# Patient Record
Sex: FEMALE | Race: WHITE | NOT HISPANIC OR LATINO | URBAN - NONMETROPOLITAN AREA
[De-identification: names, ages, dates, MRNs, and addresses within clinical notes are randomized per-mention and may not be internally consistent; named-entity substitution may affect disease eponyms.]

---

## 2017-01-04 ENCOUNTER — OFFICE (OUTPATIENT)
Dept: URBAN - NONMETROPOLITAN AREA CLINIC 13 | Facility: CLINIC | Age: 52
End: 2017-01-04

## 2017-01-04 VITALS — HEIGHT: 64 IN

## 2017-01-04 VITALS
HEIGHT: 64 IN | SYSTOLIC BLOOD PRESSURE: 145 MMHG | DIASTOLIC BLOOD PRESSURE: 68 MMHG | WEIGHT: 212 LBS | HEART RATE: 63 BPM | RESPIRATION RATE: 18 BRPM

## 2017-01-04 DIAGNOSIS — R10.12 LEFT UPPER QUADRANT PAIN: ICD-10-CM

## 2017-01-04 DIAGNOSIS — Z79.899 OTHER LONG TERM (CURRENT) DRUG THERAPY: ICD-10-CM

## 2017-01-04 DIAGNOSIS — R74.0 NONSPECIFIC ELEVATION OF LEVELS OF TRANSAMINASE AND LACTIC A: ICD-10-CM

## 2017-01-04 DIAGNOSIS — R10.33 PERIUMBILICAL PAIN: ICD-10-CM

## 2017-01-04 DIAGNOSIS — K21.9 GASTRO-ESOPHAGEAL REFLUX DISEASE WITHOUT ESOPHAGITIS: ICD-10-CM

## 2017-01-04 DIAGNOSIS — Z12.11 ENCOUNTER FOR SCREENING FOR MALIGNANT NEOPLASM OF COLON: ICD-10-CM

## 2017-01-04 LAB
CBC EMERALD: HEMATOCRIT: 44.1 % (ref 37–47)
CBC EMERALD: HEMOGLOBIN: 15.2 G/DL — HIGH (ref 12–14)
CBC EMERALD: LYMPHS %: 24.6 % (ref 20.5–40.5)
CBC EMERALD: LYMPHS ABSOLUTE: 1.8 10*3U/L (ref 1.3–2.9)
CBC EMERALD: MCH: 30.5 PG (ref 27–32)
CBC EMERALD: MCHC: 34.5 G/DL (ref 32–35)
CBC EMERALD: MCV: 88.5 FL (ref 84–100)
CBC EMERALD: MONOS %: 6 % (ref 2–10)
CBC EMERALD: MONOS ABSOLUTE: 0.5 10*3U/L (ref 0.3–3.8)
CBC EMERALD: MPV: 8.6 FL (ref 7.4–10.4)
CBC EMERALD: NEUT. %: 69.4 % — HIGH (ref 43–65)
CBC EMERALD: NEUT. ABSOLUTE: 5.2 10*3U/L — HIGH (ref 2.2–4.8)
CBC EMERALD: PLATELETS: 230 10*3U/L (ref 130–440)
CBC EMERALD: RBC: 5 10*6U/L (ref 4.2–5.4)
CBC EMERALD: RDW: 13.1 % (ref 11.5–15.5)
CBC EMERALD: WBC: 7.5 10*3U/L (ref 4.5–10.5)

## 2017-01-04 PROCEDURE — 76700 US EXAM ABDOM COMPLETE: CPT

## 2017-01-04 PROCEDURE — 99205 OFFICE O/P NEW HI 60 MIN: CPT | Mod: 25

## 2017-01-04 PROCEDURE — 85025 COMPLETE CBC W/AUTO DIFF WBC: CPT

## 2017-01-04 PROCEDURE — 36415 COLL VENOUS BLD VENIPUNCTURE: CPT

## 2017-01-16 ENCOUNTER — OFFICE (OUTPATIENT)
Dept: URBAN - NONMETROPOLITAN AREA CLINIC 13 | Facility: CLINIC | Age: 52
End: 2017-01-16

## 2017-01-16 VITALS — HEIGHT: 64 IN

## 2017-01-16 DIAGNOSIS — R93.2 ABNORMAL FINDINGS ON DIAGNOSTIC IMAGING OF LIVER AND BILIARY: ICD-10-CM

## 2017-01-16 DIAGNOSIS — R74.0 NONSPECIFIC ELEVATION OF LEVELS OF TRANSAMINASE AND LACTIC A: ICD-10-CM

## 2017-01-16 DIAGNOSIS — R10.12 LEFT UPPER QUADRANT PAIN: ICD-10-CM

## 2017-01-16 PROCEDURE — 74170 CT ABD WO CNTRST FLWD CNTRST: CPT | Mod: TC

## 2023-07-10 ENCOUNTER — OFFICE (OUTPATIENT)
Dept: URBAN - NONMETROPOLITAN AREA CLINIC 1 | Facility: CLINIC | Age: 58
End: 2023-07-10

## 2023-07-10 VITALS
SYSTOLIC BLOOD PRESSURE: 139 MMHG | HEIGHT: 64 IN | WEIGHT: 189 LBS | HEART RATE: 72 BPM | DIASTOLIC BLOOD PRESSURE: 86 MMHG

## 2023-07-10 DIAGNOSIS — K76.0 FATTY (CHANGE OF) LIVER, NOT ELSEWHERE CLASSIFIED: ICD-10-CM

## 2023-07-10 DIAGNOSIS — I10 ESSENTIAL (PRIMARY) HYPERTENSION: ICD-10-CM

## 2023-07-10 DIAGNOSIS — R14.2 ERUCTATION: ICD-10-CM

## 2023-07-10 DIAGNOSIS — E11.9 TYPE 2 DIABETES MELLITUS WITHOUT COMPLICATIONS: ICD-10-CM

## 2023-07-10 PROCEDURE — 99204 OFFICE O/P NEW MOD 45 MIN: CPT | Performed by: NURSE PRACTITIONER

## 2023-07-10 NOTE — SERVICENOTES
The risks for colonoscopy were discussed with her and she agrees to proceed.
The bowel prep was prescribed instructions were provided to her.
Regarding the fatty liver, she is advised to work with her PCP to achieve and maintain her optimal BMI, A1c, and cholesterol.  If her LFT's are elevated, a Fibroscan should be considered.
If she has worsening upper GI symptoms, an EGD or H pylori testing should be considered.
I'll be happy to see her as needed.